# Patient Record
Sex: FEMALE | Race: WHITE | NOT HISPANIC OR LATINO | Employment: OTHER | URBAN - METROPOLITAN AREA
[De-identification: names, ages, dates, MRNs, and addresses within clinical notes are randomized per-mention and may not be internally consistent; named-entity substitution may affect disease eponyms.]

---

## 2022-10-05 ENCOUNTER — HOSPITAL ENCOUNTER (OUTPATIENT)
Facility: HOSPITAL | Age: 77
Discharge: HOME | End: 2022-10-06
Attending: EMERGENCY MEDICINE | Admitting: INTERNAL MEDICINE
Payer: MEDICARE

## 2022-10-05 DIAGNOSIS — S30.1XXA ABDOMINAL WALL HEMATOMA, INITIAL ENCOUNTER: ICD-10-CM

## 2022-10-05 DIAGNOSIS — R07.9 CHEST PAIN: ICD-10-CM

## 2022-10-05 DIAGNOSIS — S30.1XXA RECTUS SHEATH HEMATOMA, INITIAL ENCOUNTER: Primary | ICD-10-CM

## 2022-10-05 PROBLEM — I10 PRIMARY HYPERTENSION: Status: ACTIVE | Noted: 2022-10-05

## 2022-10-05 PROBLEM — C64.1 RENAL CELL CARCINOMA OF RIGHT KIDNEY: Status: ACTIVE | Noted: 2022-10-05

## 2022-10-05 PROBLEM — E11.9 TYPE 2 DIABETES MELLITUS: Status: ACTIVE | Noted: 2022-10-05

## 2022-10-05 LAB
ABO + RH BLD: NORMAL
ALBUMIN SERPL BCP-MCNC: 3.8 G/DL (ref 3.5–5.2)
ALP SERPL-CCNC: 69 U/L (ref 55–135)
ALT SERPL W/O P-5'-P-CCNC: 18 U/L (ref 10–44)
ANION GAP SERPL CALC-SCNC: 8 MMOL/L (ref 8–16)
AST SERPL-CCNC: 24 U/L (ref 10–40)
BASOPHILS # BLD AUTO: 0.06 K/UL (ref 0–0.2)
BASOPHILS NFR BLD: 0.9 % (ref 0–1.9)
BILIRUB SERPL-MCNC: 0.6 MG/DL (ref 0.1–1)
BLD GP AB SCN CELLS X3 SERPL QL: NORMAL
BUN SERPL-MCNC: 12 MG/DL (ref 8–23)
CALCIUM SERPL-MCNC: 8.8 MG/DL (ref 8.7–10.5)
CHLORIDE SERPL-SCNC: 107 MMOL/L (ref 95–110)
CO2 SERPL-SCNC: 24 MMOL/L (ref 23–29)
CREAT SERPL-MCNC: 0.8 MG/DL (ref 0.5–1.4)
DIFFERENTIAL METHOD: ABNORMAL
EOSINOPHIL # BLD AUTO: 0.1 K/UL (ref 0–0.5)
EOSINOPHIL NFR BLD: 0.9 % (ref 0–8)
ERYTHROCYTE [DISTWIDTH] IN BLOOD BY AUTOMATED COUNT: 13.1 % (ref 11.5–14.5)
EST. GFR  (NO RACE VARIABLE): >60 ML/MIN/1.73 M^2
GLUCOSE SERPL-MCNC: 91 MG/DL (ref 70–110)
HCT VFR BLD AUTO: 37 % (ref 37–48.5)
HGB BLD-MCNC: 12.4 G/DL (ref 12–16)
IMM GRANULOCYTES # BLD AUTO: 0.01 K/UL (ref 0–0.04)
IMM GRANULOCYTES NFR BLD AUTO: 0.1 % (ref 0–0.5)
INR PPP: 1 (ref 0.8–1.2)
LYMPHOCYTES # BLD AUTO: 2.1 K/UL (ref 1–4.8)
LYMPHOCYTES NFR BLD: 30.4 % (ref 18–48)
MCH RBC QN AUTO: 30.6 PG (ref 27–31)
MCHC RBC AUTO-ENTMCNC: 33.5 G/DL (ref 32–36)
MCV RBC AUTO: 91 FL (ref 82–98)
MONOCYTES # BLD AUTO: 0.5 K/UL (ref 0.3–1)
MONOCYTES NFR BLD: 7.7 % (ref 4–15)
NEUTROPHILS # BLD AUTO: 4.1 K/UL (ref 1.8–7.7)
NEUTROPHILS NFR BLD: 60 % (ref 38–73)
NRBC BLD-RTO: 0 /100 WBC
PLATELET # BLD AUTO: 137 K/UL (ref 150–450)
PLATELET BLD QL SMEAR: ABNORMAL
PMV BLD AUTO: 9.8 FL (ref 9.2–12.9)
POCT GLUCOSE: 151 MG/DL (ref 70–110)
POTASSIUM SERPL-SCNC: 4 MMOL/L (ref 3.5–5.1)
PROT SERPL-MCNC: 6.9 G/DL (ref 6–8.4)
PROTHROMBIN TIME: 10.7 SEC (ref 9–12.5)
RBC # BLD AUTO: 4.05 M/UL (ref 4–5.4)
SARS-COV-2 RDRP RESP QL NAA+PROBE: NEGATIVE
SODIUM SERPL-SCNC: 139 MMOL/L (ref 136–145)
WBC # BLD AUTO: 6.85 K/UL (ref 3.9–12.7)

## 2022-10-05 PROCEDURE — 85610 PROTHROMBIN TIME: CPT | Performed by: EMERGENCY MEDICINE

## 2022-10-05 PROCEDURE — 99203 PR OFFICE/OUTPT VISIT, NEW, LEVL III, 30-44 MIN: ICD-10-PCS | Mod: ,,, | Performed by: SURGERY

## 2022-10-05 PROCEDURE — G0378 HOSPITAL OBSERVATION PER HR: HCPCS

## 2022-10-05 PROCEDURE — 80053 COMPREHEN METABOLIC PANEL: CPT | Performed by: EMERGENCY MEDICINE

## 2022-10-05 PROCEDURE — A4216 STERILE WATER/SALINE, 10 ML: HCPCS | Performed by: INTERNAL MEDICINE

## 2022-10-05 PROCEDURE — 85025 COMPLETE CBC W/AUTO DIFF WBC: CPT | Performed by: EMERGENCY MEDICINE

## 2022-10-05 PROCEDURE — 99285 EMERGENCY DEPT VISIT HI MDM: CPT | Mod: 25,CS

## 2022-10-05 PROCEDURE — U0002 COVID-19 LAB TEST NON-CDC: HCPCS | Performed by: EMERGENCY MEDICINE

## 2022-10-05 PROCEDURE — 86850 RBC ANTIBODY SCREEN: CPT | Performed by: EMERGENCY MEDICINE

## 2022-10-05 PROCEDURE — 99203 OFFICE O/P NEW LOW 30 MIN: CPT | Mod: ,,, | Performed by: SURGERY

## 2022-10-05 PROCEDURE — 25000003 PHARM REV CODE 250: Performed by: INTERNAL MEDICINE

## 2022-10-05 RX ORDER — LOSARTAN POTASSIUM AND HYDROCHLOROTHIAZIDE 12.5; 5 MG/1; MG/1
1 TABLET ORAL DAILY
COMMUNITY

## 2022-10-05 RX ORDER — PAROXETINE HYDROCHLORIDE 20 MG/1
20 TABLET, FILM COATED ORAL DAILY
Status: DISCONTINUED | OUTPATIENT
Start: 2022-10-06 | End: 2022-10-06 | Stop reason: HOSPADM

## 2022-10-05 RX ORDER — LANOLIN ALCOHOL/MO/W.PET/CERES
800 CREAM (GRAM) TOPICAL
Status: DISCONTINUED | OUTPATIENT
Start: 2022-10-05 | End: 2022-10-06 | Stop reason: HOSPADM

## 2022-10-05 RX ORDER — AMOXICILLIN 250 MG
1 CAPSULE ORAL 2 TIMES DAILY PRN
Status: DISCONTINUED | OUTPATIENT
Start: 2022-10-05 | End: 2022-10-06 | Stop reason: HOSPADM

## 2022-10-05 RX ORDER — ACETAMINOPHEN 325 MG/1
650 TABLET ORAL EVERY 4 HOURS PRN
Status: DISCONTINUED | OUTPATIENT
Start: 2022-10-05 | End: 2022-10-06 | Stop reason: HOSPADM

## 2022-10-05 RX ORDER — GLUCAGON 1 MG
1 KIT INJECTION
Status: DISCONTINUED | OUTPATIENT
Start: 2022-10-05 | End: 2022-10-06 | Stop reason: HOSPADM

## 2022-10-05 RX ORDER — IBUPROFEN 200 MG
24 TABLET ORAL
Status: DISCONTINUED | OUTPATIENT
Start: 2022-10-05 | End: 2022-10-06 | Stop reason: HOSPADM

## 2022-10-05 RX ORDER — SODIUM,POTASSIUM PHOSPHATES 280-250MG
2 POWDER IN PACKET (EA) ORAL
Status: DISCONTINUED | OUTPATIENT
Start: 2022-10-05 | End: 2022-10-06 | Stop reason: HOSPADM

## 2022-10-05 RX ORDER — AMOXICILLIN 500 MG
1 CAPSULE ORAL DAILY
COMMUNITY

## 2022-10-05 RX ORDER — HYDROCODONE BITARTRATE AND ACETAMINOPHEN 5; 325 MG/1; MG/1
1 TABLET ORAL EVERY 6 HOURS PRN
Status: DISCONTINUED | OUTPATIENT
Start: 2022-10-05 | End: 2022-10-06 | Stop reason: HOSPADM

## 2022-10-05 RX ORDER — PAROXETINE HYDROCHLORIDE 20 MG/1
20 TABLET, FILM COATED ORAL DAILY
COMMUNITY

## 2022-10-05 RX ORDER — IBUPROFEN 200 MG
16 TABLET ORAL
Status: DISCONTINUED | OUTPATIENT
Start: 2022-10-05 | End: 2022-10-06 | Stop reason: HOSPADM

## 2022-10-05 RX ORDER — SIMETHICONE 80 MG
1 TABLET,CHEWABLE ORAL 4 TIMES DAILY PRN
Status: DISCONTINUED | OUTPATIENT
Start: 2022-10-05 | End: 2022-10-06 | Stop reason: HOSPADM

## 2022-10-05 RX ORDER — MAG HYDROX/ALUMINUM HYD/SIMETH 200-200-20
30 SUSPENSION, ORAL (FINAL DOSE FORM) ORAL 4 TIMES DAILY PRN
Status: DISCONTINUED | OUTPATIENT
Start: 2022-10-05 | End: 2022-10-06 | Stop reason: HOSPADM

## 2022-10-05 RX ORDER — TRAZODONE HYDROCHLORIDE 50 MG/1
75 TABLET ORAL NIGHTLY
COMMUNITY

## 2022-10-05 RX ORDER — INSULIN ASPART 100 [IU]/ML
0-5 INJECTION, SOLUTION INTRAVENOUS; SUBCUTANEOUS
Status: DISCONTINUED | OUTPATIENT
Start: 2022-10-05 | End: 2022-10-06 | Stop reason: HOSPADM

## 2022-10-05 RX ORDER — SODIUM CHLORIDE 0.9 % (FLUSH) 0.9 %
10 SYRINGE (ML) INJECTION
Status: DISCONTINUED | OUTPATIENT
Start: 2022-10-05 | End: 2022-10-06 | Stop reason: HOSPADM

## 2022-10-05 RX ORDER — CHOLECALCIFEROL (VITAMIN D3) 25 MCG
1000 TABLET ORAL DAILY
COMMUNITY

## 2022-10-05 RX ORDER — NALOXONE HCL 0.4 MG/ML
0.02 VIAL (ML) INJECTION
Status: DISCONTINUED | OUTPATIENT
Start: 2022-10-05 | End: 2022-10-06 | Stop reason: HOSPADM

## 2022-10-05 RX ORDER — PRAVASTATIN SODIUM 40 MG/1
40 TABLET ORAL NIGHTLY
COMMUNITY

## 2022-10-05 RX ORDER — ONDANSETRON 2 MG/ML
4 INJECTION INTRAMUSCULAR; INTRAVENOUS EVERY 8 HOURS PRN
Status: DISCONTINUED | OUTPATIENT
Start: 2022-10-05 | End: 2022-10-06 | Stop reason: HOSPADM

## 2022-10-05 RX ORDER — ZINC SULFATE 50(220)MG
220 CAPSULE ORAL DAILY
COMMUNITY

## 2022-10-05 RX ORDER — IBUPROFEN 100 MG/5ML
1000 SUSPENSION, ORAL (FINAL DOSE FORM) ORAL DAILY
COMMUNITY

## 2022-10-05 RX ORDER — PRAVASTATIN SODIUM 20 MG/1
40 TABLET ORAL NIGHTLY
Status: DISCONTINUED | OUTPATIENT
Start: 2022-10-05 | End: 2022-10-06 | Stop reason: HOSPADM

## 2022-10-05 RX ORDER — NIFEDIPINE 90 MG/1
90 TABLET, EXTENDED RELEASE ORAL DAILY
COMMUNITY

## 2022-10-05 RX ORDER — IPRATROPIUM BROMIDE AND ALBUTEROL SULFATE 2.5; .5 MG/3ML; MG/3ML
3 SOLUTION RESPIRATORY (INHALATION) EVERY 4 HOURS PRN
Status: DISCONTINUED | OUTPATIENT
Start: 2022-10-05 | End: 2022-10-06 | Stop reason: HOSPADM

## 2022-10-05 RX ORDER — DOCUSATE SODIUM 100 MG/1
100 CAPSULE, LIQUID FILLED ORAL NIGHTLY
COMMUNITY

## 2022-10-05 RX ORDER — ONDANSETRON 8 MG/1
8 TABLET, ORALLY DISINTEGRATING ORAL EVERY 8 HOURS PRN
Status: DISCONTINUED | OUTPATIENT
Start: 2022-10-05 | End: 2022-10-06 | Stop reason: HOSPADM

## 2022-10-05 RX ORDER — MONTELUKAST SODIUM 10 MG/1
10 TABLET ORAL DAILY
COMMUNITY

## 2022-10-05 RX ORDER — RUTIN/HESP/BIOFLAV/C/HERBAL196 40-25-50MG
1 TABLET ORAL DAILY
COMMUNITY

## 2022-10-05 RX ORDER — TALC
6 POWDER (GRAM) TOPICAL NIGHTLY PRN
Status: DISCONTINUED | OUTPATIENT
Start: 2022-10-05 | End: 2022-10-06 | Stop reason: HOSPADM

## 2022-10-05 RX ORDER — LANOLIN ALCOHOL/MO/W.PET/CERES
1000 CREAM (GRAM) TOPICAL DAILY
COMMUNITY

## 2022-10-05 RX ORDER — SODIUM CHLORIDE 0.9 % (FLUSH) 0.9 %
10 SYRINGE (ML) INJECTION EVERY 8 HOURS
Status: DISCONTINUED | OUTPATIENT
Start: 2022-10-05 | End: 2022-10-06 | Stop reason: HOSPADM

## 2022-10-05 RX ORDER — HYDROCODONE BITARTRATE AND ACETAMINOPHEN 10; 325 MG/1; MG/1
1 TABLET ORAL EVERY 6 HOURS PRN
Status: DISCONTINUED | OUTPATIENT
Start: 2022-10-05 | End: 2022-10-06 | Stop reason: HOSPADM

## 2022-10-05 RX ADMIN — Medication 10 ML: at 11:10

## 2022-10-05 RX ADMIN — TRAZODONE HYDROCHLORIDE 75 MG: 50 TABLET ORAL at 08:10

## 2022-10-05 RX ADMIN — PRAVASTATIN SODIUM 40 MG: 20 TABLET ORAL at 08:10

## 2022-10-05 NOTE — CONSULTS
[facility] - Emergency Dept.  General Surgery  Consult Note    Patient Name: Evie Matthews  MRN: [id number]  Code Status: No Order  Admission Date: 10/5/2022  Hospital Length of Stay: 0 days  Attending Physician: Calvin Harrington Jr., MD  Primary Care Provider: Herb Wesley MD    Patient information was obtained from spouse/SO, relative(s), ER records and Images from [facility].     Consults  Subjective:     Principal Problem: <principal problem not specified>    History of Present Illness: 77-year-old female who presented to [facility] emergency room due to some right lower quadrant abdominal pain.  She was concerned that she might have appendicitis.  The patient has been doing what she describes as rather strenuous yd work over the last 2 or 3 days.      The pain was located in the right lower abdomen was sharp to stabbing in nature.  Was not associated with any nausea, vomiting, fever chills.      She was evaluated with a CT scan there that showed a rectus sheath hematoma with active extravasation.  She was transferred to [facility] in [place] as there was no Interventional Radiology available at [facility].      Her only antiplatelet medication is aspirin           No current facility-administered medications on file prior to encounter.     No current outpatient medications on file prior to encounter.       Review of patient's allergies indicates:  No Known Allergies    No past medical history on file.  No past surgical history on file.  Family History    None       Tobacco Use    Smoking status: Not on file    Smokeless tobacco: Not on file   Substance and Sexual Activity    Alcohol use: Not on file    Drug use: Not on file    Sexual activity: Not on file     Review of Systems   Constitutional:  Negative for appetite change, chills, fatigue, fever and unexpected weight change.   HENT:  Negative for hearing loss and rhinorrhea.    Eyes:  Negative for visual disturbance.   Respiratory:  Negative for  apnea, cough, shortness of breath and wheezing.    Cardiovascular:  Negative for chest pain and palpitations.   Gastrointestinal:  Positive for abdominal pain. Negative for abdominal distention, blood in stool, constipation, diarrhea, nausea and vomiting.   Genitourinary:  Negative for dysuria, frequency and urgency.   Musculoskeletal:  Negative for arthralgias and neck pain.   Skin:  Negative for rash.   Neurological:  Negative for seizures, weakness, numbness and headaches.   Hematological:  Negative for adenopathy. Does not bruise/bleed easily.   Psychiatric/Behavioral:  Negative for hallucinations. The patient is not nervous/anxious.    Objective:     Vital Signs (Most Recent):  Temp: 98.6 °F (37 °C) (10/05/22 1357)  Pulse: (!) 58 (10/05/22 1500)  Resp: 20 (10/05/22 1500)  BP: (!) 144/72 (10/05/22 1500)  SpO2: 98 % (10/05/22 1500)   Vital Signs (24h Range):  Temp:  [98.6 °F (37 °C)] 98.6 °F (37 °C)  Pulse:  [58-80] 58  Resp:  [18-20] 20  SpO2:  [97 %-99 %] 98 %  BP: (140-162)/(68-77) 144/72     Weight: 64.9 kg (143 lb)  There is no height or weight on file to calculate BMI.    Physical Exam  Vitals and nursing note reviewed.   Constitutional:       Appearance: She is well-developed.   HENT:      Head: Normocephalic.   Eyes:      Pupils: Pupils are equal, round, and reactive to light.   Neck:      Thyroid: No thyromegaly.      Vascular: No JVD.      Trachea: No tracheal deviation.   Cardiovascular:      Rate and Rhythm: Normal rate and regular rhythm.      Heart sounds: Normal heart sounds.   Pulmonary:      Breath sounds: Normal breath sounds. No wheezing. Rales: ..  Abdominal:      General: Bowel sounds are normal. There is no distension.      Palpations: Abdomen is soft. Abdomen is not rigid. There is no mass.      Tenderness: There is abdominal tenderness (Mild right lower quadrant over the rectus muscle). There is no guarding or rebound.   Musculoskeletal:         General: Normal range of motion.    Lymphadenopathy:      Cervical: No cervical adenopathy.   Skin:     General: Skin is warm and dry.      Findings: No erythema or rash.   Neurological:      Mental Status: She is oriented to person, place, and time.       Significant Labs:  I have reviewed all pertinent lab results within the past 24 hours.  CBC:   Recent Labs   Lab 10/05/22  1534   WBC 6.85   RBC 4.05   HGB 12.4   HCT 37.0   *   MCV 91   MCH 30.6   MCHC 33.5     BMP: No results for input(s): GLU, NA, K, CL, CO2, BUN, CREATININE, CALCIUM, MG in the last 168 hours.  CMP: No results for input(s): GLU, CALCIUM, ALBUMIN, PROT, NA, K, CO2, CL, BUN, CREATININE, ALKPHOS, ALT, AST, BILITOT in the last 168 hours.    Significant Diagnostics:  I have reviewed all pertinent imaging results/findings within the past 24 hours.     CT scan [facility] from [facility] reviewed.  There is a gallstone that is calcified.  There is a hematoma of the lower rectus sheath on the right with a contrast blush    Repeat pelvic CT    Narrative & Impression  EXAMINATION:  CT ABDOMEN PELVIS WITHOUT CONTRAST     CLINICAL HISTORY:  Right lower quadrant pain., Abdominal pain, acute (Ped 0-18y);     TECHNIQUE:  Limited noncontrast CT scan of the abdomen and pelvis.     COMPARISON:  Abdominal CT scan from outside institution 10/05/2022.     FINDINGS:  Lung bases are clear with no obvious infiltrate or pleural effusion.     The liver is nonenlarged.  The gallbladder demonstrates several medium-size calcified gallstones.  The gallbladder is mildly distended     The spleen is nonenlarged with several punctate calcifications.     The abdominal aorta is nondilated.  Minimal calcification is seen.  The IVC is normal.     The pancreatic region appears normal.     No visible right-sided kidney is seen.  The left kidney is grossly normal in size with contrast within the collecting system and ureter.  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     The bowel loops are nondilated.     In the pelvis there is hyperdense contrast in the urinary bladder.     The uterus is absent.     There is hyperdense thickening of the right rectus muscle with infiltration of the deep abdominal wall adipose tissue.  Findings are consistent with a right rectus sheath hematoma.  The overall size is similar to the recent CT scan.     Minimal pelvic fluid.     Moderate lumbar degenerative disc disease.     Impression:     Right inferior rectus sheath abdominal wall hematoma.  Associated infiltration of the deep abdominal wall adipose tissue.  Overall size similar allowing for differences in technique to the recent CT scan from outside institution at 07:00 hours.     Status post hysterectomy.     Cholelithiasis.     Solitary left kidney.     All CT scans at this facility are performed  using dose modulation techniques as appropriate to performed exam including the following:  automated exposure control; adjustment of mA and/or kV according to the patients size (this includes techniques or standardized protocols for targeted exams where dose is matched to indication/reason for exam: i.e. extremities or head);  iterative reconstruction technique.        Electronically signed by: Boston Fuentes MD  Date:                                            10/05/2022    Assessment/Plan:     Rectus sheath hematoma, initial encounter  Traumatic rectus sheath hematoma.      Although there was a contrast blush on the initial CT scan the repeat CT scan shows no significant change in size.  The patient is pain free.      Her hemoglobin and hematocrit are stable.      Recommend admission to observation, Hospital Medicine   Serial hemoglobin and hematocrit  If her hemoglobin and hematocrit drops then angiography with embolization per Interventional Radiology.      Surgical intervention only if enlarging hematoma that could not be controlled by Interventional Radiology of the patient develops  an infection.      This was discussed with the patient and her relative, son.            VTE Risk Mitigation (From admission, onward)    None          Thank you for your consult. I will follow-up with patient. Please contact us if you have any additional questions.    Segun Bustamante MD  General Surgery  [facility] - Emergency Dept.

## 2022-10-05 NOTE — PHARMACY MED REC
"Admission Medication History     The home medication history was taken by Escobar Batista.    You may go to "Admission" then "Reconcile Home Medications" tabs to review and/or act upon these items.     The home medication list has been updated by the Pharmacy department.   Please read ALL comments highlighted in yellow.   Please address this information as you see fit.    Feel free to contact us if you have any questions or require assistance.      Medications listed below were obtained from: Patient/family, [employer], and Patient's pharmacy  (Not in a hospital admission)      Potential issues to be addressed PRIOR TO DISCHARGE: NONE    Escobar Batista, CPhT-Adv  Pharmacy Technician Specialist-Medication History  [facility] [phone]  Secure chat preferred     Current Outpatient Medications on File Prior to Encounter   Medication Sig Dispense Refill Last Dose    ascorbic acid, vitamin C, (VITAMIN C) 1000 MG tablet Take 1,000 mg by mouth once daily.   10/4/2022    cyanocobalamin (VITAMIN B-12) 1000 MCG tablet Take 1,000 mcg by mouth once daily.   10/4/2022    DAILY GARLIC ONCE-A-DAY ORAL Take 1 tablet by mouth once daily.   10/4/2022    docusate sodium (COLACE) 100 MG capsule Take 100 mg by mouth every evening.   10/4/2022    losartan-hydrochlorothiazide 50-12.5 mg (HYZAAR) 50-12.5 mg per tablet Take 1 tablet by mouth once daily.   10/4/2022    montelukast (SINGULAIR) 10 mg tablet Take 10 mg by mouth once daily.   10/4/2022    NIFEdipine (PROCARDIA-XL) 90 MG (OSM) 24 hr tablet Take 90 mg by mouth once daily.   10/4/2022    omega-3 fatty acids/fish oil (FISH OIL-OMEGA-3 FATTY ACIDS) 300-1,000 mg capsule Take 1 capsule by mouth once daily.   10/4/2022    paroxetine (PAXIL) 20 MG tablet Take 20 mg by mouth once daily.   10/4/2022    pravastatin (PRAVACHOL) 40 MG tablet Take 40 mg by mouth every evening.   10/4/2022    traZODone (DESYREL) 50 MG tablet Take 75 mg by mouth every evening.   10/4/2022    vit " [id number] (BIOFLEX) 856-72-12-40 mg Tab Take 1 tablet by mouth once daily.   10/4/2022    vitamin D (VITAMIN D3) 1000 units Tab Take 1,000 Units by mouth once daily.   10/4/2022    zinc sulfate (ZINCATE) 50 mg zinc (220 mg) capsule Take 220 mg by mouth once daily.   10/4/2022                           .

## 2022-10-05 NOTE — ASSESSMENT & PLAN NOTE
Patient's FSGs are controlled on current medication regimen.  Last A1c reviewed- Most recent fingerstick glucose reviewed- Current correctional scale  Low  Maintain anti-hyperglycemic dose as follows-   Antihyperglycemics (From admission, onward)    Start     Stop Route Frequency Ordered    10/05/22 1849  insulin aspart U-100 pen 0-5 Units         -- SubQ Before meals & nightly PRN 10/05/22 1756        Hold Oral hypoglycemics while patient is in the hospital.

## 2022-10-05 NOTE — SUBJECTIVE & OBJECTIVE
No current facility-administered medications on file prior to encounter.     No current outpatient medications on file prior to encounter.       Review of patient's allergies indicates:  No Known Allergies    No past medical history on file.  No past surgical history on file.  Family History    None       Tobacco Use    Smoking status: Not on file    Smokeless tobacco: Not on file   Substance and Sexual Activity    Alcohol use: Not on file    Drug use: Not on file    Sexual activity: Not on file     Review of Systems   Constitutional:  Negative for appetite change, chills, fatigue, fever and unexpected weight change.   HENT:  Negative for hearing loss and rhinorrhea.    Eyes:  Negative for visual disturbance.   Respiratory:  Negative for apnea, cough, shortness of breath and wheezing.    Cardiovascular:  Negative for chest pain and palpitations.   Gastrointestinal:  Positive for abdominal pain. Negative for abdominal distention, blood in stool, constipation, diarrhea, nausea and vomiting.   Genitourinary:  Negative for dysuria, frequency and urgency.   Musculoskeletal:  Negative for arthralgias and neck pain.   Skin:  Negative for rash.   Neurological:  Negative for seizures, weakness, numbness and headaches.   Hematological:  Negative for adenopathy. Does not bruise/bleed easily.   Psychiatric/Behavioral:  Negative for hallucinations. The patient is not nervous/anxious.    Objective:     Vital Signs (Most Recent):  Temp: 98.6 °F (37 °C) (10/05/22 1357)  Pulse: (!) 58 (10/05/22 1500)  Resp: 20 (10/05/22 1500)  BP: (!) 144/72 (10/05/22 1500)  SpO2: 98 % (10/05/22 1500)   Vital Signs (24h Range):  Temp:  [98.6 °F (37 °C)] 98.6 °F (37 °C)  Pulse:  [58-80] 58  Resp:  [18-20] 20  SpO2:  [97 %-99 %] 98 %  BP: (140-162)/(68-77) 144/72     Weight: 64.9 kg (143 lb)  There is no height or weight on file to calculate BMI.    Physical Exam  Vitals and nursing note reviewed.   Constitutional:       Appearance: She is  well-developed.   HENT:      Head: Normocephalic.   Eyes:      Pupils: Pupils are equal, round, and reactive to light.   Neck:      Thyroid: No thyromegaly.      Vascular: No JVD.      Trachea: No tracheal deviation.   Cardiovascular:      Rate and Rhythm: Normal rate and regular rhythm.      Heart sounds: Normal heart sounds.   Pulmonary:      Breath sounds: Normal breath sounds. No wheezing. Rales: ..  Abdominal:      General: Bowel sounds are normal. There is no distension.      Palpations: Abdomen is soft. Abdomen is not rigid. There is no mass.      Tenderness: There is abdominal tenderness (Mild right lower quadrant over the rectus muscle). There is no guarding or rebound.   Musculoskeletal:         General: Normal range of motion.   Lymphadenopathy:      Cervical: No cervical adenopathy.   Skin:     General: Skin is warm and dry.      Findings: No erythema or rash.   Neurological:      Mental Status: She is oriented to person, place, and time.       Significant Labs:  I have reviewed all pertinent lab results within the past 24 hours.  CBC:   Recent Labs   Lab 10/05/22  1534   WBC 6.85   RBC 4.05   HGB 12.4   HCT 37.0   *   MCV 91   MCH 30.6   MCHC 33.5     BMP: No results for input(s): GLU, NA, K, CL, CO2, BUN, CREATININE, CALCIUM, MG in the last 168 hours.  CMP: No results for input(s): GLU, CALCIUM, ALBUMIN, PROT, NA, K, CO2, CL, BUN, CREATININE, ALKPHOS, ALT, AST, BILITOT in the last 168 hours.    Significant Diagnostics:  I have reviewed all pertinent imaging results/findings within the past 24 hours.     CT scan images from [facility] reviewed.  There is a gallstone that is calcified.  There is a hematoma of the lower rectus sheath on the right with a contrast blush    Repeat pelvic CT    Narrative & Impression  EXAMINATION:  CT ABDOMEN PELVIS WITHOUT CONTRAST     CLINICAL HISTORY:  Right lower quadrant pain., Abdominal pain, acute (Ped 0-18y);     TECHNIQUE:  Limited noncontrast CT  scan of the abdomen and pelvis.     COMPARISON:  Abdominal CT scan from outside institution 10/05/2022.     FINDINGS:  Lung bases are clear with no obvious infiltrate or pleural effusion.     The liver is nonenlarged.  The gallbladder demonstrates several medium-size calcified gallstones.  The gallbladder is mildly distended     The spleen is nonenlarged with several punctate calcifications.     The abdominal aorta is nondilated.  Minimal calcification is seen.  The IVC is normal.     The pancreatic region appears normal.     No visible right-sided kidney is seen.  The left kidney is grossly normal in size with contrast within the collecting system and ureter.  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     The bowel loops are nondilated.     In the pelvis there is hyperdense contrast in the urinary bladder.     The uterus is absent.     There is hyperdense thickening of the right rectus muscle with infiltration of the deep abdominal wall adipose tissue.  Findings are consistent with a right rectus sheath hematoma.  The overall size is similar to the recent CT scan.     Minimal pelvic fluid.     Moderate lumbar degenerative disc disease.     Impression:     Right inferior rectus sheath abdominal wall hematoma.  Associated infiltration of the deep abdominal wall adipose tissue.  Overall size similar allowing for differences in technique to the recent CT scan from outside institution at 07:00 hours.     Status post hysterectomy.     Cholelithiasis.     Solitary left kidney.     All CT scans at this facility are performed  using dose modulation techniques as appropriate to performed exam including the following:  automated exposure control; adjustment of mA and/or kV according to the patients size (this includes techniques or standardized protocols for targeted exams where dose is matched to indication/reason for exam: i.e. extremities or head);  iterative reconstruction technique.         Electronically signed by: Boston Fuentes MD  Date:                                            10/05/2022

## 2022-10-05 NOTE — ASSESSMENT & PLAN NOTE
Monitor for Decompensation  IR on case  Check H/H  Repeat CT - Negative for change as compared to the 7 am study at [facility]

## 2022-10-05 NOTE — H&P
[facility]  [facility]  History & Physical    Patient Name: Evie Matthews  MRN: [id number]  Patient Class: OP- Observation  Admission Date: 10/5/2022  Attending Physician: Segundo Rodrigues MD  Primary Care Provider: Herb Wesley MD         Patient information was obtained from patient, relative(s), past medical records and ER records.     Subjective:     Principal Problem:Rectus sheath hematoma, initial encounter    Chief Complaint:   Chief Complaint   Patient presents with    Abdominal Pain     Pt. Is a transfer from [place] for RLQ pain for 3 day. R/O gallstones & hematoma to right abd. Rectus with active bleeding.        HPI: Patient 76 yo female presenting in transfer from [facility] for Rectus Abdominal Wall Hematoma. Patient with a  pmhx significant for RCC s/p R Nephrectomy, DM2, HTN identified with R Abdominal wall hematoma - Patient underwent a follow up CT on presentation and it was determined the Hematoma was unchanged from th 0700 exam. Patient reports improvement in her discomfort and is requesting food. Patient with a fall from her hourse several days ago with a progressive abdominal pain. Patient reports symptoms are controlled and currently she is pain free. Patient case reviewed with IR who felt no intervention was warranted at this time. Plan is to place the patient in observation, NPO after midnight, hold anticoagulation, obtain H/H in am and consider next steps based on the developing clinical picture. Surgery evaluated the patient and did not feel the patient had a surgical need at this juncture.     Patient is a full code               Patient is placed in Observation    Past Medical History:   Diagnosis Date    Cancer     Diabetes mellitus     Hypertension        Past Surgical History:   Procedure Laterality Date    FRACTURE SURGERY      HYSTERECTOMY      LAPAROSCOPIC NEPHRECTOMY         Review of patient's allergies indicates:  No Known Allergies    No current  facility-administered medications on file prior to encounter.     Current Outpatient Medications on File Prior to Encounter   Medication Sig    ascorbic acid, vitamin C, (VITAMIN C) 1000 MG tablet Take 1,000 mg by mouth once daily.    cyanocobalamin (VITAMIN B-12) 1000 MCG tablet Take 1,000 mcg by mouth once daily.    DAILY GARLIC ONCE-A-DAY ORAL Take 1 tablet by mouth once daily.    docusate sodium (COLACE) 100 MG capsule Take 100 mg by mouth every evening.    losartan-hydrochlorothiazide 50-12.5 mg (HYZAAR) 50-12.5 mg per tablet Take 1 tablet by mouth once daily.    montelukast (SINGULAIR) 10 mg tablet Take 10 mg by mouth once daily.    NIFEdipine (PROCARDIA-XL) 90 MG (OSM) 24 hr tablet Take 90 mg by mouth once daily.    omega-3 fatty acids/fish oil (FISH OIL-OMEGA-3 FATTY ACIDS) 300-1,000 mg capsule Take 1 capsule by mouth once daily.    paroxetine (PAXIL) 20 MG tablet Take 20 mg by mouth once daily.    pravastatin (PRAVACHOL) 40 MG tablet Take 40 mg by mouth every evening.    traZODone (DESYREL) 50 MG tablet Take 75 mg by mouth every evening.    vit I-nqhqvtn-hvve-rutin-hb196 (BIOFLEX) 745-12-35-40 mg Tab Take 1 tablet by mouth once daily.    vitamin D (VITAMIN D3) 1000 units Tab Take 1,000 Units by mouth once daily.    zinc sulfate (ZINCATE) 50 mg zinc (220 mg) capsule Take 220 mg by mouth once daily.     Family History       Problem Relation (Age of Onset)    Diabetes Father    Heart disease Father    Hypertension Mother    Stroke Mother          Tobacco Use    Smoking status: Never    Smokeless tobacco: Never   Substance and Sexual Activity    Alcohol use: Not Currently     Alcohol/week: 1.0 standard drink     Types: 1 Glasses of wine per week    Drug use: Never    Sexual activity: Not Currently     Partners: Male     Review of Systems   Constitutional:  Positive for activity change. Negative for unexpected weight change.   HENT: Negative.  Negative for trouble swallowing.    Eyes: Negative.    Respiratory:  Negative.  Negative for cough, shortness of breath and wheezing.    Cardiovascular: Negative.  Negative for chest pain.   Gastrointestinal:  Positive for abdominal pain.   Endocrine: Negative.    Genitourinary: Negative.    Musculoskeletal: Negative.    Skin: Negative.    Allergic/Immunologic: Negative.    Neurological:  Positive for weakness. Negative for dizziness.   Hematological: Negative.    Psychiatric/Behavioral: Negative.     All other systems reviewed and are negative.  Objective:     Vital Signs (Most Recent):  Temp: 98.6 °F (37 °C) (10/05/22 1357)  Pulse: 62 (10/05/22 1630)  Resp: (!) 24 (10/05/22 1630)  BP: 121/62 (10/05/22 1630)  SpO2: 97 % (10/05/22 1630)   Vital Signs (24h Range):  Temp:  [98.6 °F (37 °C)] 98.6 °F (37 °C)  Pulse:  [58-80] 62  Resp:  [18-24] 24  SpO2:  [97 %-99 %] 97 %  BP: (121-162)/(62-77) 121/62     Weight: 64.9 kg (143 lb)  Body mass index is 23.8 kg/m².    Physical Exam  Vitals and nursing note reviewed.   Constitutional:       General: She is not in acute distress.     Appearance: Normal appearance. She is well-developed. She is not ill-appearing, toxic-appearing or diaphoretic.   HENT:      Head: Normocephalic and atraumatic.      Right Ear: External ear normal.      Left Ear: External ear normal.   Eyes:      Pupils: Pupils are equal, round, and reactive to light.   Neck:      Vascular: No JVD.   Cardiovascular:      Rate and Rhythm: Normal rate and regular rhythm.      Pulses: Normal pulses.      Heart sounds: Normal heart sounds. No murmur heard.  Pulmonary:      Effort: Pulmonary effort is normal. No respiratory distress.      Breath sounds: Normal breath sounds. No wheezing.   Abdominal:      General: Bowel sounds are normal. There is no distension.      Palpations: Abdomen is soft.      Tenderness: There is abdominal tenderness. There is no guarding.   Musculoskeletal:         General: No tenderness. Normal range of motion.      Cervical back: Normal range of motion and  neck supple.   Skin:     General: Skin is warm and dry.      Findings: No erythema.   Neurological:      Mental Status: She is alert and oriented to person, place, and time.      Cranial Nerves: No cranial nerve deficit.      Deep Tendon Reflexes: Reflexes are normal and symmetric.   Psychiatric:         Mood and Affect: Mood normal.         Behavior: Behavior normal.         Thought Content: Thought content normal.         Judgment: Judgment normal.         CRANIAL NERVES     CN III, IV, VI   Pupils are equal, round, and reactive to light.     Significant Labs: All pertinent labs within the past 24 hours have been reviewed.  BMP:   Recent Labs   Lab 10/05/22  1534   GLU 91      K 4.0      CO2 24   BUN 12   CREATININE 0.8   CALCIUM 8.8     CBC:   Recent Labs   Lab 10/05/22  1534   WBC 6.85   HGB 12.4   HCT 37.0   *     CMP:   Recent Labs   Lab 10/05/22  1534      K 4.0      CO2 24   GLU 91   BUN 12   CREATININE 0.8   CALCIUM 8.8   PROT 6.9   ALBUMIN 3.8   BILITOT 0.6   ALKPHOS 69   AST 24   ALT 18   ANIONGAP 8     Imaging Results              CT Abdomen Pelvis  Without Contrast (Final result)  Result time 10/05/22 16:15:38   Procedure changed from CT Abdomen Pelvis With Contrast     Final result by Boston Fuentes MD (10/05/22 16:15:38)                   Impression:      Right inferior rectus sheath abdominal wall hematoma.  Associated infiltration of the deep abdominal wall adipose tissue.  Overall size similar allowing for differences in technique to the recent CT scan from outside institution at 07:00 hours.    Status post hysterectomy.    Cholelithiasis.    Solitary left kidney.    All CT scans at this facility are performed  using dose modulation techniques as appropriate to performed exam including the following:  automated exposure control; adjustment of mA and/or kV according to the patients size (this includes techniques or standardized protocols for targeted exams where  dose is matched to indication/reason for exam: i.e. extremities or head);  iterative reconstruction technique.      Electronically signed by: Boston Fuentes MD  Date:    10/05/2022  Time:    16:15               Narrative:    EXAMINATION:  CT ABDOMEN PELVIS WITHOUT CONTRAST    CLINICAL HISTORY:  Right lower quadrant pain., Abdominal pain, acute (Ped 0-18y);    TECHNIQUE:  Limited noncontrast CT scan of the abdomen and pelvis.    COMPARISON:  Abdominal CT scan from outside institution 10/05/2022.    FINDINGS:  Lung bases are clear with no obvious infiltrate or pleural effusion.    The liver is nonenlarged.  The gallbladder demonstrates several medium-size calcified gallstones.  The gallbladder is mildly distended    The spleen is nonenlarged with several punctate calcifications.    The abdominal aorta is nondilated.  Minimal calcification is seen.  The IVC is normal.    The pancreatic region appears normal.    No visible right-sided kidney is seen.  The left kidney is grossly normal in size with contrast within the collecting system and ureter.  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    The bowel loops are nondilated.    In the pelvis there is hyperdense contrast in the urinary bladder.    The uterus is absent.    There is hyperdense thickening of the right rectus muscle with infiltration of the deep abdominal wall adipose tissue.  Findings are consistent with a right rectus sheath hematoma.  The overall size is similar to the recent CT scan.    Minimal pelvic fluid.    Moderate lumbar degenerative disc disease.                                        Significant Imaging: I have reviewed all pertinent imaging results/findings within the past 24 hours.    Assessment/Plan:     * Rectus sheath hematoma, initial encounter  Monitor for Decompensation  IR on case  Check H/H  Repeat CT - Negative for change as compared to the 7 am study at OSH      Type 2 diabetes mellitus  Patient's  FSGs are controlled on current medication regimen.  Last A1c reviewed- Most recent fingerstick glucose reviewed- Current correctional scale  Low  Maintain anti-hyperglycemic dose as follows-   Antihyperglycemics (From admission, onward)      Start     Stop Route Frequency Ordered    10/05/22 1849  insulin aspart U-100 pen 0-5 Units         -- SubQ Before meals & nightly PRN 10/05/22 1756          Hold Oral hypoglycemics while patient is in the hospital.    Renal cell carcinoma of right kidney  S/p R Nephrectomy  Stable  Monitor  O/P follow up      Primary hypertension  BP Controlled  Monitor        VTE Risk Mitigation (From admission, onward)           Ordered     IP VTE HIGH RISK PATIENT  Once         10/05/22 1756     Place sequential compression device  Until discontinued         10/05/22 1756     Place sequential compression device  Until discontinued         10/05/22 1756     Reason for No Pharmacological VTE Prophylaxis  Once        Question:  Reasons:  Answer:  Active Bleeding    10/05/22 1756                       Segundo Rodrigues MD  Department of Hospital Medicine   [facility] - Emergency Dept.

## 2022-10-05 NOTE — HPI
Patient 76 yo female presenting in transfer from [facility] for Rectus Abdominal Wall Hematoma. Patient with a  pmhx significant for RCC s/p R Nephrectomy, DM2, HTN identified with R Abdominal wall hematoma - Patient underwent a follow up CT on presentation and it was determined the Hematoma was unchanged from th 0700 exam. Patient reports improvement in her discomfort and is requesting food. Patient with a fall from her hourse several days ago with a progressive abdominal pain. Patient reports symptoms are controlled and currently she is pain free. Patient case reviewed with IR who felt no intervention was warranted at this time. Plan is to place the patient in observation, NPO after midnight, hold anticoagulation, obtain H/H in am and consider next steps based on the developing clinical picture. Surgery evaluated the patient and did not feel the patient had a surgical need at this juncture.     Patient is a full code

## 2022-10-05 NOTE — ED PROVIDER NOTES
SCRIBE #1 NOTE: I, Juan Ramon Escamilla, am scribing for, and in the presence of, Calvin Harrington Jr., MD. I have scribed the HPI, ROS, and PEx.      History      Chief Complaint   Patient presents with    Abdominal Pain     Pt. Is a transfer from [place] for RLQ pain for 3 day. R/O gallstones & hematoma to right abd. Rectus with active bleeding.     Review of patient's allergies indicates:  No Known Allergies     HPI   HPI    10/5/2022, 2:09 PM   History obtained from the patient      History of Present Illness: Evie Matthews is a 77 y.o. female patient with a PMHx of renal cell cancer, HTN, DM2 who presents to the Emergency Department for RLQ abdominal pain, onset last night. Pt was transferred from [facility] for Interventional Radiology services after CT showed findings concerning for rectus abdominal muscle hematoma. Symptoms are constant and moderate in severity. No mitigating or exacerbating factors reported. No associated sxs reported. Patient denies any trauma/injury, fever, chills, n/v/d, SOB, CP, weakness, numbness, dizziness, headache, and all other sxs at this time. No prior Tx reported. No further complaints or concerns at this time.     Arrival mode: EMS    PCP: Herb Wesley MD       Past Medical History:  No past medical history on file.    Past Surgical History:  No past surgical history on file.      Family History:  No family history on file.    Social History:  Social History     Tobacco Use    Smoking status: Not on file    Smokeless tobacco: Not on file   Substance and Sexual Activity    Alcohol use: Not on file    Drug use: Not on file    Sexual activity: Not on file       ROS   Review of Systems   Constitutional:  Negative for chills and fever.   HENT:  Negative for sore throat.    Respiratory:  Negative for shortness of breath.    Cardiovascular:  Negative for chest pain.   Gastrointestinal:  Positive for abdominal pain (RLQ). Negative for diarrhea, nausea and vomiting.   Genitourinary:  Negative for  dysuria.   Musculoskeletal:  Negative for back pain.   Skin:  Negative for rash.   Neurological:  Negative for dizziness, weakness, light-headedness, numbness and headaches.   Hematological:  Does not bruise/bleed easily.   All other systems reviewed and are negative.    Physical Exam      Initial Vitals [10/05/22 1357]   BP Pulse Resp Temp SpO2   (!) 140/68 80 18 98.6 °F (37 °C) 97 %      MAP       --          Physical Exam  Nursing Notes and Vital Signs Reviewed.  Constitutional: Patient is in no acute distress. Well-developed and well-nourished.  Head: Atraumatic. Normocephalic.  Eyes: PERRL. EOM intact. Conjunctivae are not pale. No scleral icterus.  ENT: Mucous membranes are moist. Oropharynx is clear and symmetric.    Neck: Supple. Full ROM. No lymphadenopathy.  Cardiovascular: Regular rate. Regular rhythm. No murmurs, rubs, or gallops. Distal pulses are 2+ and symmetric.  Pulmonary/Chest: No respiratory distress. Clear to auscultation bilaterally. No wheezing or rales.  Abdominal: Soft and non-distended.  There is RLQ tenderness.  No rebound, guarding, or rigidity.   Musculoskeletal: Moves all extremities. No obvious deformities. No edema.  Skin: Warm and dry.  Neurological:  Alert, awake, and appropriate.  Normal speech.  No acute focal neurological deficits are appreciated.  Psychiatric: Normal affect. Good eye contact. Appropriate in content.    ED Course    Procedures  ED Vital Signs:  Vitals:    10/05/22 1357 10/05/22 1500 10/05/22 1503   BP: (!) 140/68 (!) 144/72    Pulse: 80 (!) 58    Resp: 18 20    Temp: 98.6 °F (37 °C)     TempSrc: Oral     SpO2: 97% 98%    Weight:   64.9 kg (143 lb)       Abnormal Lab Results:  Labs Reviewed   CBC W/ AUTO DIFFERENTIAL - Abnormal; Notable for the following components:       Result Value    Platelets 137 (*)     Platelet Estimate Clumped (*)     All other components within normal limits   COMPREHENSIVE METABOLIC PANEL   PROTIME-INR   SARS-COV-2 RNA AMPLIFICATION, QUAL    TYPE & SCREEN        All Lab Results:  Results for orders placed or performed during the hospital encounter of 10/05/22   CBC auto differential   Result Value Ref Range    WBC 6.85 3.90 - 12.70 K/uL    RBC 4.05 4.00 - 5.40 M/uL    Hemoglobin 12.4 12.0 - 16.0 g/dL    Hematocrit 37.0 37.0 - 48.5 %    MCV 91 82 - 98 fL    MCH 30.6 27.0 - 31.0 pg    MCHC 33.5 32.0 - 36.0 g/dL    RDW 13.1 11.5 - 14.5 %    Platelets 137 (L) 150 - 450 K/uL    MPV 9.8 9.2 - 12.9 fL    Immature Granulocytes 0.1 0.0 - 0.5 %    Gran # (ANC) 4.1 1.8 - 7.7 K/uL    Immature Grans (Abs) 0.01 0.00 - 0.04 K/uL    Lymph # 2.1 1.0 - 4.8 K/uL    Mono # 0.5 0.3 - 1.0 K/uL    Eos # 0.1 0.0 - 0.5 K/uL    Baso # 0.06 0.00 - 0.20 K/uL    nRBC 0 0 /100 WBC    Gran % 60.0 38.0 - 73.0 %    Lymph % 30.4 18.0 - 48.0 %    Mono % 7.7 4.0 - 15.0 %    Eosinophil % 0.9 0.0 - 8.0 %    Basophil % 0.9 0.0 - 1.9 %    Platelet Estimate Clumped (A)     Differential Method Automated    Comprehensive metabolic panel   Result Value Ref Range    Sodium 139 136 - 145 mmol/L    Potassium 4.0 3.5 - 5.1 mmol/L    Chloride 107 95 - 110 mmol/L    CO2 24 23 - 29 mmol/L    Glucose 91 70 - 110 mg/dL    BUN 12 8 - 23 mg/dL    Creatinine 0.8 0.5 - 1.4 mg/dL    Calcium 8.8 8.7 - 10.5 mg/dL    Total Protein 6.9 6.0 - 8.4 g/dL    Albumin 3.8 3.5 - 5.2 g/dL    Total Bilirubin 0.6 0.1 - 1.0 mg/dL    Alkaline Phosphatase 69 55 - 135 U/L    AST 24 10 - 40 U/L    ALT 18 10 - 44 U/L    Anion Gap 8 8 - 16 mmol/L    eGFR >60 >60 mL/min/1.73 m^2         Imaging Results:  Imaging Results              CT Abdomen Pelvis  Without Contrast (Final result)  Result time 10/05/22 16:15:38   Procedure changed from CT Abdomen Pelvis With Contrast     Final result by Boston Fuentes MD (10/05/22 16:15:38)                   Impression:      Right inferior rectus sheath abdominal wall hematoma.  Associated infiltration of the deep abdominal wall adipose tissue.  Overall size similar allowing for  differences in technique to the recent CT scan from outside institution at 07:00 hours.    Status post hysterectomy.    Cholelithiasis.    Solitary left kidney.    All CT scans at this facility are performed  using dose modulation techniques as appropriate to performed exam including the following:  automated exposure control; adjustment of mA and/or kV according to the patients size (this includes techniques or standardized protocols for targeted exams where dose is matched to indication/reason for exam: i.e. extremities or head);  iterative reconstruction technique.      Electronically signed by: Boston Fuentes MD  Date:    10/05/2022  Time:    16:15               Narrative:    EXAMINATION:  CT ABDOMEN PELVIS WITHOUT CONTRAST    CLINICAL HISTORY:  Right lower quadrant pain., Abdominal pain, acute (Ped 0-18y);    TECHNIQUE:  Limited noncontrast CT scan of the abdomen and pelvis.    COMPARISON:  Abdominal CT scan from outside institution 10/05/2022.    FINDINGS:  Lung bases are clear with no obvious infiltrate or pleural effusion.    The liver is nonenlarged.  The gallbladder demonstrates several medium-size calcified gallstones.  The gallbladder is mildly distended    The spleen is nonenlarged with several punctate calcifications.    The abdominal aorta is nondilated.  Minimal calcification is seen.  The IVC is normal.    The pancreatic region appears normal.    No visible right-sided kidney is seen.  The left kidney is grossly normal in size with contrast within the collecting system and ureter.  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    The bowel loops are nondilated.    In the pelvis there is hyperdense contrast in the urinary bladder.    The uterus is absent.    There is hyperdense thickening of the right rectus muscle with infiltration of the deep abdominal wall adipose tissue.  Findings are consistent with a right rectus sheath hematoma.  The overall size is similar  to the recent CT scan.    Minimal pelvic fluid.    Moderate lumbar degenerative disc disease.                                         Labs and Imaging Reviewed from [facility]:    CT Abdomen/Pelvis with Contrast:    Impression: Hematoma, right rectus abdominis muscle with active bleeding. In the right rectus sheath, there is marked enlargement of the rectus abdominous muscle with heterogenous pattern. The area shows an area of focal enhancement with active bleeding into a hematoma. The area measures approximately 6 x 3.5 x 9.5 cm.    CBC, CMP, and Lipase were all WNL  Flu and COVID were both negative         The Emergency Provider reviewed the vital signs and test results, which are outlined above.    ED Discussion     3:09 PM: Discussed pt's case with Dr. Bustamante (General Surgery) who recommends admission to Hospital Medicine with Interventional Radiology consult. Dr. Bustamante will see the pt as a consult.    3:15 PM: Discussed pt's case with Dr. Abbott (Interventional Radiology) who recommends repeat CBC and repeat pelvic CT w/o contrast. Dr. Abbott also recommends admission to Hospital Medicine for serial hemoglobin.    4:11 PM: Discussed case with Patricia Baker NP (Hospital Medicine). Dr. Rodrigues agrees with current care and management of pt and accepts admission.   Admitting Service: Hospital Medicine  Admitting Physician: Dr. Rodrigues  Admit to: Obs    4:12 PM: Re-evaluated pt. I have discussed test results, shared treatment plan, and the need for admission with patient and family at bedside. Pt and family express understanding at this time and agree with all information. All questions answered. Pt and family have no further questions or concerns at this time. Pt is ready for admit.         ED Medication(s):  Medications - No data to display      New Prescriptions    No medications on file         Medical Decision Making    Medical Decision Making:   Clinical Tests:   Lab Tests: Ordered and  Reviewed  Radiological Study: Ordered and Reviewed         Scribe Attestation:   Scribe #1: I performed the above scribed service and the documentation accurately describes the services I performed. I attest to the accuracy of the note.    Attending:   Physician Attestation Statement for Scribe #1: I, Calvin Harrington Jr., MD, personally performed the services described in this documentation, as scribed by Juan Ramon Escamilla, in my presence, and it is both accurate and complete.          Clinical Impression       ICD-10-CM ICD-9-CM   1. Abdominal wall hematoma, initial encounter  S30.1XXA 922.2       Disposition:   Disposition: Placed in Observation  Condition: Fair       Calvin Harrington Jr., MD  10/05/22 9847

## 2022-10-05 NOTE — ASSESSMENT & PLAN NOTE
Traumatic rectus sheath hematoma.      Although there was a contrast blush on the initial CT scan the repeat CT scan shows no significant change in size.  The patient is pain free.      Her hemoglobin and hematocrit are stable.      Recommend admission to observation, Hospital Medicine   Serial hemoglobin and hematocrit  If her hemoglobin and hematocrit drops then angiography with embolization per Interventional Radiology.      Surgical intervention only if enlarging hematoma that could not be controlled by Interventional Radiology of the patient develops an infection.      This was discussed with the patient and her relative, son.

## 2022-10-05 NOTE — HOSPITAL COURSE
The patient was transferred to the emergency room.  A repeat CT scan will be obtained as well as labs she is resting comfortably and denies any abdominal pain.  She is not tachycardic or diaphoretic.    10/6/2:  no pain, hemoglobin 11

## 2022-10-05 NOTE — SUBJECTIVE & OBJECTIVE
Past Medical History:   Diagnosis Date    Cancer     Diabetes mellitus     Hypertension        Past Surgical History:   Procedure Laterality Date    FRACTURE SURGERY      HYSTERECTOMY      LAPAROSCOPIC NEPHRECTOMY         Review of patient's allergies indicates:  No Known Allergies    No current facility-administered medications on file prior to encounter.     Current Outpatient Medications on File Prior to Encounter   Medication Sig    ascorbic acid, vitamin C, (VITAMIN C) 1000 MG tablet Take 1,000 mg by mouth once daily.    cyanocobalamin (VITAMIN B-12) 1000 MCG tablet Take 1,000 mcg by mouth once daily.    DAILY GARLIC ONCE-A-DAY ORAL Take 1 tablet by mouth once daily.    docusate sodium (COLACE) 100 MG capsule Take 100 mg by mouth every evening.    losartan-hydrochlorothiazide 50-12.5 mg (HYZAAR) 50-12.5 mg per tablet Take 1 tablet by mouth once daily.    montelukast (SINGULAIR) 10 mg tablet Take 10 mg by mouth once daily.    NIFEdipine (PROCARDIA-XL) 90 MG (OSM) 24 hr tablet Take 90 mg by mouth once daily.    omega-3 fatty acids/fish oil (FISH OIL-OMEGA-3 FATTY ACIDS) 300-1,000 mg capsule Take 1 capsule by mouth once daily.    paroxetine (PAXIL) 20 MG tablet Take 20 mg by mouth once daily.    pravastatin (PRAVACHOL) 40 MG tablet Take 40 mg by mouth every evening.    traZODone (DESYREL) 50 MG tablet Take 75 mg by mouth every evening.    vit L-mnqiprw-ffko-rutin-hb196 (BIOFLEX) 473-18-06-40 mg Tab Take 1 tablet by mouth once daily.    vitamin D (VITAMIN D3) 1000 units Tab Take 1,000 Units by mouth once daily.    zinc sulfate (ZINCATE) 50 mg zinc (220 mg) capsule Take 220 mg by mouth once daily.     Family History       Problem Relation (Age of Onset)    Diabetes Father    Heart disease Father    Hypertension Mother    Stroke Mother          Tobacco Use    Smoking status: Never    Smokeless tobacco: Never   Substance and Sexual Activity    Alcohol use: Not Currently     Alcohol/week: 1.0 standard drink     Types:  1 Glasses of wine per week    Drug use: Never    Sexual activity: Not Currently     Partners: Male     Review of Systems   Constitutional:  Positive for activity change. Negative for unexpected weight change.   HENT: Negative.  Negative for trouble swallowing.    Eyes: Negative.    Respiratory: Negative.  Negative for cough, shortness of breath and wheezing.    Cardiovascular: Negative.  Negative for chest pain.   Gastrointestinal:  Positive for abdominal pain.   Endocrine: Negative.    Genitourinary: Negative.    Musculoskeletal: Negative.    Skin: Negative.    Allergic/Immunologic: Negative.    Neurological:  Positive for weakness. Negative for dizziness.   Hematological: Negative.    Psychiatric/Behavioral: Negative.     All other systems reviewed and are negative.  Objective:     Vital Signs (Most Recent):  Temp: 98.6 °F (37 °C) (10/05/22 1357)  Pulse: 62 (10/05/22 1630)  Resp: (!) 24 (10/05/22 1630)  BP: 121/62 (10/05/22 1630)  SpO2: 97 % (10/05/22 1630)   Vital Signs (24h Range):  Temp:  [98.6 °F (37 °C)] 98.6 °F (37 °C)  Pulse:  [58-80] 62  Resp:  [18-24] 24  SpO2:  [97 %-99 %] 97 %  BP: (121-162)/(62-77) 121/62     Weight: 64.9 kg (143 lb)  Body mass index is 23.8 kg/m².    Physical Exam  Vitals and nursing note reviewed.   Constitutional:       General: She is not in acute distress.     Appearance: Normal appearance. She is well-developed. She is not ill-appearing, toxic-appearing or diaphoretic.   HENT:      Head: Normocephalic and atraumatic.      Right Ear: External ear normal.      Left Ear: External ear normal.   Eyes:      Pupils: Pupils are equal, round, and reactive to light.   Neck:      Vascular: No JVD.   Cardiovascular:      Rate and Rhythm: Normal rate and regular rhythm.      Pulses: Normal pulses.      Heart sounds: Normal heart sounds. No murmur heard.  Pulmonary:      Effort: Pulmonary effort is normal. No respiratory distress.      Breath sounds: Normal breath sounds. No wheezing.    Abdominal:      General: Bowel sounds are normal. There is no distension.      Palpations: Abdomen is soft.      Tenderness: There is abdominal tenderness. There is no guarding.   Musculoskeletal:         General: No tenderness. Normal range of motion.      Cervical back: Normal range of motion and neck supple.   Skin:     General: Skin is warm and dry.      Findings: No erythema.   Neurological:      Mental Status: She is alert and oriented to person, place, and time.      Cranial Nerves: No cranial nerve deficit.      Deep Tendon Reflexes: Reflexes are normal and symmetric.   Psychiatric:         Mood and Affect: Mood normal.         Behavior: Behavior normal.         Thought Content: Thought content normal.         Judgment: Judgment normal.         CRANIAL NERVES     CN III, IV, VI   Pupils are equal, round, and reactive to light.     Significant Labs: All pertinent labs within the past 24 hours have been reviewed.  BMP:   Recent Labs   Lab 10/05/22  1534   GLU 91      K 4.0      CO2 24   BUN 12   CREATININE 0.8   CALCIUM 8.8     CBC:   Recent Labs   Lab 10/05/22  1534   WBC 6.85   HGB 12.4   HCT 37.0   *     CMP:   Recent Labs   Lab 10/05/22  1534      K 4.0      CO2 24   GLU 91   BUN 12   CREATININE 0.8   CALCIUM 8.8   PROT 6.9   ALBUMIN 3.8   BILITOT 0.6   ALKPHOS 69   AST 24   ALT 18   ANIONGAP 8     Imaging Results              CT Abdomen Pelvis  Without Contrast (Final result)  Result time 10/05/22 16:15:38   Procedure changed from CT Abdomen Pelvis With Contrast     Final result by Boston Fuentes MD (10/05/22 16:15:38)                   Impression:      Right inferior rectus sheath abdominal wall hematoma.  Associated infiltration of the deep abdominal wall adipose tissue.  Overall size similar allowing for differences in technique to the recent CT scan from outside institution at 07:00 hours.    Status post hysterectomy.    Cholelithiasis.    Solitary left  kidney.    All CT scans at this facility are performed  using dose modulation techniques as appropriate to performed exam including the following:  automated exposure control; adjustment of mA and/or kV according to the patients size (this includes techniques or standardized protocols for targeted exams where dose is matched to indication/reason for exam: i.e. extremities or head);  iterative reconstruction technique.      Electronically signed by: Boston Fuentes MD  Date:    10/05/2022  Time:    16:15               Narrative:    EXAMINATION:  CT ABDOMEN PELVIS WITHOUT CONTRAST    CLINICAL HISTORY:  Right lower quadrant pain., Abdominal pain, acute (Ped 0-18y);    TECHNIQUE:  Limited noncontrast CT scan of the abdomen and pelvis.    COMPARISON:  Abdominal CT scan from outside institution 10/05/2022.    FINDINGS:  Lung bases are clear with no obvious infiltrate or pleural effusion.    The liver is nonenlarged.  The gallbladder demonstrates several medium-size calcified gallstones.  The gallbladder is mildly distended    The spleen is nonenlarged with several punctate calcifications.    The abdominal aorta is nondilated.  Minimal calcification is seen.  The IVC is normal.    The pancreatic region appears normal.    No visible right-sided kidney is seen.  The left kidney is grossly normal in size with contrast within the collecting system and ureter.  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    The bowel loops are nondilated.    In the pelvis there is hyperdense contrast in the urinary bladder.    The uterus is absent.    There is hyperdense thickening of the right rectus muscle with infiltration of the deep abdominal wall adipose tissue.  Findings are consistent with a right rectus sheath hematoma.  The overall size is similar to the recent CT scan.    Minimal pelvic fluid.    Moderate lumbar degenerative disc disease.                                        Significant Imaging:  I have reviewed all pertinent imaging results/findings within the past 24 hours.

## 2022-10-05 NOTE — HPI
77-year-old female who presented to [facility] emergency room due to some right lower quadrant abdominal pain.  She was concerned that she might have appendicitis.  The patient has been doing what she describes as rather strenuous yd work over the last 2 or 3 days.      The pain was located in the right lower abdomen was sharp to stabbing in nature.  Was not associated with any nausea, vomiting, fever chills.      She was evaluated with a CT scan there that showed a rectus sheath hematoma with active extravasation.  She was transferred to [facility] in [place] as there was no Interventional Radiology available at [facility].      Her only antiplatelet medication is aspirin

## 2022-10-06 VITALS
WEIGHT: 143 LBS | RESPIRATION RATE: 18 BRPM | OXYGEN SATURATION: 95 % | HEART RATE: 65 BPM | TEMPERATURE: 99 F | DIASTOLIC BLOOD PRESSURE: 68 MMHG | SYSTOLIC BLOOD PRESSURE: 126 MMHG | BODY MASS INDEX: 23.82 KG/M2 | HEIGHT: 65 IN

## 2022-10-06 LAB
ANION GAP SERPL CALC-SCNC: 9 MMOL/L (ref 8–16)
BASOPHILS # BLD AUTO: 0.05 K/UL (ref 0–0.2)
BASOPHILS NFR BLD: 0.8 % (ref 0–1.9)
BUN SERPL-MCNC: 13 MG/DL (ref 8–23)
CALCIUM SERPL-MCNC: 9.4 MG/DL (ref 8.7–10.5)
CHLORIDE SERPL-SCNC: 106 MMOL/L (ref 95–110)
CO2 SERPL-SCNC: 27 MMOL/L (ref 23–29)
CREAT SERPL-MCNC: 0.9 MG/DL (ref 0.5–1.4)
DIFFERENTIAL METHOD: ABNORMAL
EOSINOPHIL # BLD AUTO: 0.1 K/UL (ref 0–0.5)
EOSINOPHIL NFR BLD: 1.8 % (ref 0–8)
ERYTHROCYTE [DISTWIDTH] IN BLOOD BY AUTOMATED COUNT: 13.1 % (ref 11.5–14.5)
EST. GFR  (NO RACE VARIABLE): >60 ML/MIN/1.73 M^2
GLUCOSE SERPL-MCNC: 106 MG/DL (ref 70–110)
HCT VFR BLD AUTO: 35 % (ref 37–48.5)
HGB BLD-MCNC: 11.5 G/DL (ref 12–16)
IMM GRANULOCYTES # BLD AUTO: 0.02 K/UL (ref 0–0.04)
IMM GRANULOCYTES NFR BLD AUTO: 0.3 % (ref 0–0.5)
LYMPHOCYTES # BLD AUTO: 2.1 K/UL (ref 1–4.8)
LYMPHOCYTES NFR BLD: 31.3 % (ref 18–48)
MCH RBC QN AUTO: 30.7 PG (ref 27–31)
MCHC RBC AUTO-ENTMCNC: 32.9 G/DL (ref 32–36)
MCV RBC AUTO: 93 FL (ref 82–98)
MONOCYTES # BLD AUTO: 0.6 K/UL (ref 0.3–1)
MONOCYTES NFR BLD: 8.7 % (ref 4–15)
NEUTROPHILS # BLD AUTO: 3.7 K/UL (ref 1.8–7.7)
NEUTROPHILS NFR BLD: 57.1 % (ref 38–73)
NRBC BLD-RTO: 0 /100 WBC
PLATELET # BLD AUTO: 267 K/UL (ref 150–450)
PMV BLD AUTO: 9 FL (ref 9.2–12.9)
POCT GLUCOSE: 101 MG/DL (ref 70–110)
POTASSIUM SERPL-SCNC: 4.6 MMOL/L (ref 3.5–5.1)
RBC # BLD AUTO: 3.75 M/UL (ref 4–5.4)
SODIUM SERPL-SCNC: 142 MMOL/L (ref 136–145)
WBC # BLD AUTO: 6.55 K/UL (ref 3.9–12.7)

## 2022-10-06 PROCEDURE — 80048 BASIC METABOLIC PNL TOTAL CA: CPT | Performed by: INTERNAL MEDICINE

## 2022-10-06 PROCEDURE — 99213 PR OFFICE/OUTPT VISIT, EST, LEVL III, 20-29 MIN: ICD-10-PCS | Mod: ,,, | Performed by: SURGERY

## 2022-10-06 PROCEDURE — 25000003 PHARM REV CODE 250: Performed by: INTERNAL MEDICINE

## 2022-10-06 PROCEDURE — G0378 HOSPITAL OBSERVATION PER HR: HCPCS

## 2022-10-06 PROCEDURE — 36415 COLL VENOUS BLD VENIPUNCTURE: CPT | Performed by: INTERNAL MEDICINE

## 2022-10-06 PROCEDURE — 85025 COMPLETE CBC W/AUTO DIFF WBC: CPT | Performed by: INTERNAL MEDICINE

## 2022-10-06 PROCEDURE — A4216 STERILE WATER/SALINE, 10 ML: HCPCS | Performed by: INTERNAL MEDICINE

## 2022-10-06 PROCEDURE — 99213 OFFICE O/P EST LOW 20 MIN: CPT | Mod: ,,, | Performed by: SURGERY

## 2022-10-06 RX ADMIN — PAROXETINE HYDROCHLORIDE 20 MG: 20 TABLET, FILM COATED ORAL at 08:10

## 2022-10-06 RX ADMIN — Medication 10 ML: at 06:10

## 2022-10-06 NOTE — DISCHARGE SUMMARY
[facility] Med [facility]  [facility] Medicine  Discharge Summary      Patient Name: Evie Matthews  MRN: [id number]  Patient Class: OP- Observation  Admission Date: 10/5/2022  Hospital Length of Stay: 0 days  Discharge Date and Time: 10/6/2022  9:07 AM  Attending Physician: Segundo Rodrigues MD   Discharging Provider: Tommy Jeffries NP  Primary Care Provider: Herb Wesley MD      HPI:   Patient 78 yo female presenting in transfer from [facility] for Rectus Abdominal Wall Hematoma. Patient with a  pmhx significant for RCC s/p R Nephrectomy, DM2, HTN identified with R Abdominal wall hematoma - Patient underwent a follow up CT on presentation and it was determined the Hematoma was unchanged from th 0700 exam. Patient reports improvement in her discomfort and is requesting food. Patient with a fall from her hourse several days ago with a progressive abdominal pain. Patient reports symptoms are controlled and currently she is pain free. Patient case reviewed with IR who felt no intervention was warranted at this time. Plan is to place the patient in observation, NPO after midnight, hold anticoagulation, obtain H/H in am and consider next steps based on the developing clinical picture. Surgery evaluated the patient and did not feel the patient had a surgical need at this juncture.     Patient is a full code                   * No surgery found *      Hospital Course:   Ms Matthews is a 77 year old female who presented to [facility] from [facility] for further evaluation of Rectus sheath hematoma. There was no IR available at [facility]. Case was reviewed with IR with Melvin who felt no intervention was warranted at this time. General Surgery was consulted, who recommend since hemoglobin and hematocrit are stable. Continue serial H & H. Today,  patient feeling well, vital signs and labs stable. Per general Surgery, H & H are stable, no acute need for surgery. Patient was seen, examined and deemed stable for discharge.         Goals of Care Treatment Preferences:  Code Status: Full Code      Consults:     No new Assessment & Plan notes have been filed under this hospital service since the last note was generated.  Service: Hospital Medicine    Final Active Diagnoses:    Diagnosis Date Noted POA    PRINCIPAL PROBLEM:  Rectus sheath hematoma, initial encounter [S30.1XXA] 10/05/2022 Yes    Primary hypertension [I10] 10/05/2022 Yes    Renal cell carcinoma of right kidney [C64.1] 10/05/2022 Yes    Type 2 diabetes mellitus [E11.9] 10/05/2022 Yes      Problems Resolved During this Admission:       Discharged Condition: stable    Disposition: Home or Self Care    Follow Up:   Follow-up Information     Herb Wesley MD. Schedule an appointment as soon as possible for a visit in 4 day(s).    Specialty: Family Medicine  Why: Hosptial follow up for Rectus Sheath hematoma  Contact information:  [address] [id number] [phone]                       Patient Instructions:      Diet Cardiac     Activity as tolerated       Significant Diagnostic Studies: Labs:   CMP   Recent Labs   Lab 10/05/22  1534 10/06/22  0530    142   K 4.0 4.6    106   CO2 24 27   GLU 91 106   BUN 12 13   CREATININE 0.8 0.9   CALCIUM 8.8 9.4   PROT 6.9  --    ALBUMIN 3.8  --    BILITOT 0.6  --    ALKPHOS 69  --    AST 24  --    ALT 18  --    ANIONGAP 8 9    and CBC   Recent Labs   Lab 10/05/22  1534 10/06/22  0531   WBC 6.85 6.55   HGB 12.4 11.5*   HCT 37.0 35.0*   * 267       Pending Diagnostic Studies:     None         Medications:  Reconciled Home Medications:      Medication List      CONTINUE taking these medications    BIOFLEX 938-34-50-40 mg Tab  Generic drug: vit T-htqiyys-iduz-rutin-hb196  Take 1 tablet by mouth once daily.     cyanocobalamin 1000 MCG tablet  Commonly known as: VITAMIN B-12  Take 1,000 mcg by mouth once daily.     DAILY GARLIC ONCE-A-DAY ORAL  Take 1 tablet by mouth once daily.     docusate  sodium 100 MG capsule  Commonly known as: COLACE  Take 100 mg by mouth every evening.     fish oil-omega-3 fatty acids 300-1,000 mg capsule  Take 1 capsule by mouth once daily.     losartan-hydrochlorothiazide 50-12.5 mg 50-12.5 mg per tablet  Commonly known as: HYZAAR  Take 1 tablet by mouth once daily.     montelukast 10 mg tablet  Commonly known as: SINGULAIR  Take 10 mg by mouth once daily.     NIFEdipine 90 MG (OSM) 24 hr tablet  Commonly known as: PROCARDIA-XL  Take 90 mg by mouth once daily.     paroxetine 20 MG tablet  Commonly known as: PAXIL  Take 20 mg by mouth once daily.     pravastatin 40 MG tablet  Commonly known as: PRAVACHOL  Take 40 mg by mouth every evening.     traZODone 50 MG tablet  Commonly known as: DESYREL  Take 75 mg by mouth every evening.     VITAMIN C 1000 MG tablet  Generic drug: ascorbic acid (vitamin C)  Take 1,000 mg by mouth once daily.     vitamin D 1000 units Tab  Commonly known as: VITAMIN D3  Take 1,000 Units by mouth once daily.     zinc sulfate 50 mg zinc (220 mg) capsule  Commonly known as: ZINCATE  Take 220 mg by mouth once daily.            Indwelling Lines/Drains at time of discharge:   Lines/Drains/Airways     None                 Time spent on the discharge of patient: 45 minutes         Tommy Jeffries NP  Department of Hospital Medicine  [facility]

## 2022-10-06 NOTE — PROGRESS NOTES
[facility]  General Surgery  Progress Note    Subjective:     History of Present Illness:  77-year-old female who presented to [facility] emergency room due to some right lower quadrant abdominal pain.  She was concerned that she might have appendicitis.  The patient has been doing what she describes as rather strenuous yd work over the last 2 or 3 days.      The pain was located in the right lower abdomen was sharp to stabbing in nature.  Was not associated with any nausea, vomiting, fever chills.      She was evaluated with a CT scan there that showed a rectus sheath hematoma with active extravasation.  She was transferred to [facility] in [place] as there was no Interventional Radiology available at [facility].      Her only antiplatelet medication is aspirin           Post-Op Info:  * No surgery found *         Interval History: no pain, hemoglobin stable    Medications:  Continuous Infusions:  Scheduled Meds:   paroxetine  20 mg Oral Daily    pravastatin  40 mg Oral QHS    sodium chloride 0.9%  10 mL Intravenous Q8H    traZODone  75 mg Oral QHS     PRN Meds:acetaminophen, albuterol-ipratropium, aluminum-magnesium hydroxide-simethicone, dextrose 10%, dextrose 10%, glucagon (human recombinant), glucose, glucose, HYDROcodone-acetaminophen, HYDROcodone-acetaminophen, insulin aspart U-100, magnesium oxide, magnesium oxide, melatonin, naloxone, ondansetron, ondansetron, potassium bicarbonate, potassium bicarbonate, potassium bicarbonate, potassium, sodium phosphates, potassium, sodium phosphates, potassium, sodium phosphates, senna-docusate 8.6-50 mg, simethicone, sodium chloride 0.9%     Review of patient's allergies indicates:  No Known Allergies  Objective:     Vital Signs (Most Recent):  Temp: 99.2 °F (37.3 °C) (10/06/22 0745)  Pulse: 65 (10/06/22 0745)  Resp: 18 (10/06/22 0745)  BP: 126/68 (10/06/22 0745)  SpO2: 95 % (10/06/22 0745) Vital Signs (24h Range):  Temp:  [98.1 °F (36.7  °C)-99.2 °F (37.3 °C)] 99.2 °F (37.3 °C)  Pulse:  [58-80] 65  Resp:  [17-24] 18  SpO2:  [95 %-99 %] 95 %  BP: (106-162)/(62-79) 126/68     Weight: 64.9 kg (143 lb)  Body mass index is 23.8 kg/m².    Intake/Output - Last 3 Shifts         10/04 0700  10/05 0659 10/05 0700  10/06 0659 10/06 0700  10/07 0659           Stool Occurrence   0 x            Physical Exam  Constitutional:       Appearance: She is well-developed.   HENT:      Head: Normocephalic.   Eyes:      Pupils: Pupils are equal, round, and reactive to light.   Neck:      Thyroid: No thyromegaly.      Vascular: No JVD.      Trachea: No tracheal deviation.   Cardiovascular:      Rate and Rhythm: Normal rate and regular rhythm.      Heart sounds: Normal heart sounds.   Pulmonary:      Breath sounds: Normal breath sounds. No wheezing.   Abdominal:      General: Bowel sounds are normal. There is no distension.      Palpations: Abdomen is soft. Abdomen is not rigid. There is no mass.      Tenderness: There is abdominal tenderness (rlq). There is no guarding or rebound.   Musculoskeletal:         General: Normal range of motion.   Lymphadenopathy:      Cervical: No cervical adenopathy.   Skin:     General: Skin is warm and dry.      Findings: No erythema or rash.   Neurological:      Mental Status: She is oriented to person, place, and time.       Significant Labs:  I have reviewed all pertinent lab results within the past 24 hours.  CBC:   Recent Labs   Lab 10/06/22  0531   WBC 6.55   RBC 3.75*   HGB 11.5*   HCT 35.0*      MCV 93   MCH 30.7   MCHC 32.9     BMP:   Recent Labs   Lab 10/06/22  0530         K 4.6      CO2 27   BUN 13   CREATININE 0.9   CALCIUM 9.4       Significant Diagnostics:  I have reviewed all pertinent imaging results/findings within the past 24 hours.  No new    Assessment/Plan:     * Rectus sheath hematoma, initial encounter  Traumatic rectus sheath hematoma.      Although there was a contrast blush on the initial  CT scan the repeat CT scan shows no significant change in size.  The patient is pain free except with palpation.      Her hemoglobin and hematocrit are stable.      No acute need for surgery    Recall if needed            Segun Bustamante MD  General Surgery  [facility]

## 2022-10-06 NOTE — ASSESSMENT & PLAN NOTE
Traumatic rectus sheath hematoma.      Although there was a contrast blush on the initial CT scan the repeat CT scan shows no significant change in size.  The patient is pain free except with palpation.      Her hemoglobin and hematocrit are stable.      No acute need for surgery    Recall if needed

## 2022-10-06 NOTE — HOSPITAL COURSE
Ms Matthews is a 77 year old female who presented to [facility] from [facility] for further evaluation of Rectus sheath hematoma. There was no IR available at [facility]. Case was reviewed with IR with Melvin who felt no intervention was warranted at this time. General Surgery was consulted, who recommend since hemoglobin and hematocrit are stable. Continue serial H & H. Today,  patient feeling well, vital signs and labs stable. Per general Surgery, H & H are stable, no acute need for surgery. Patient was seen, examined and deemed stable for discharge.

## 2022-10-06 NOTE — PLAN OF CARE
Pt AAO x4.  Pt remains free of falls throughout shift.  Pt denies pain throughout shift.  Plan of care reviewed. Pt verbalizes understanding.  Pt moving and turning independently. Frequent weight shifting encouraged.    Went over discharge instructions with patient at bedside.  Stressed the importance of making and keeping all appointments.  Pt verbalized understanding.   Had all questions regarding discharge answered to satisfaction.  IV removed without complications.  Tele box removed and returned to monitor tech.  Discharged patient via wheelchair to personal transportation.

## 2022-10-06 NOTE — SUBJECTIVE & OBJECTIVE
Interval History: no pain, hemoglobin stable    Medications:  Continuous Infusions:  Scheduled Meds:   paroxetine  20 mg Oral Daily    pravastatin  40 mg Oral QHS    sodium chloride 0.9%  10 mL Intravenous Q8H    traZODone  75 mg Oral QHS     PRN Meds:acetaminophen, albuterol-ipratropium, aluminum-magnesium hydroxide-simethicone, dextrose 10%, dextrose 10%, glucagon (human recombinant), glucose, glucose, HYDROcodone-acetaminophen, HYDROcodone-acetaminophen, insulin aspart U-100, magnesium oxide, magnesium oxide, melatonin, naloxone, ondansetron, ondansetron, potassium bicarbonate, potassium bicarbonate, potassium bicarbonate, potassium, sodium phosphates, potassium, sodium phosphates, potassium, sodium phosphates, senna-docusate 8.6-50 mg, simethicone, sodium chloride 0.9%     Review of patient's allergies indicates:  No Known Allergies  Objective:     Vital Signs (Most Recent):  Temp: 99.2 °F (37.3 °C) (10/06/22 0745)  Pulse: 65 (10/06/22 0745)  Resp: 18 (10/06/22 0745)  BP: 126/68 (10/06/22 0745)  SpO2: 95 % (10/06/22 0745) Vital Signs (24h Range):  Temp:  [98.1 °F (36.7 °C)-99.2 °F (37.3 °C)] 99.2 °F (37.3 °C)  Pulse:  [58-80] 65  Resp:  [17-24] 18  SpO2:  [95 %-99 %] 95 %  BP: (106-162)/(62-79) 126/68     Weight: 64.9 kg (143 lb)  Body mass index is 23.8 kg/m².    Intake/Output - Last 3 Shifts         10/04 0700  10/05 0659 10/05 0700  10/06 0659 10/06 0700  10/07 0659           Stool Occurrence   0 x            Physical Exam  Constitutional:       Appearance: She is well-developed.   HENT:      Head: Normocephalic.   Eyes:      Pupils: Pupils are equal, round, and reactive to light.   Neck:      Thyroid: No thyromegaly.      Vascular: No JVD.      Trachea: No tracheal deviation.   Cardiovascular:      Rate and Rhythm: Normal rate and regular rhythm.      Heart sounds: Normal heart sounds.   Pulmonary:      Breath sounds: Normal breath sounds. No wheezing.   Abdominal:      General: Bowel sounds are normal.  There is no distension.      Palpations: Abdomen is soft. Abdomen is not rigid. There is no mass.      Tenderness: There is abdominal tenderness (rlq). There is no guarding or rebound.   Musculoskeletal:         General: Normal range of motion.   Lymphadenopathy:      Cervical: No cervical adenopathy.   Skin:     General: Skin is warm and dry.      Findings: No erythema or rash.   Neurological:      Mental Status: She is oriented to person, place, and time.       Significant Labs:  I have reviewed all pertinent lab results within the past 24 hours.  CBC:   Recent Labs   Lab 10/06/22  0531   WBC 6.55   RBC 3.75*   HGB 11.5*   HCT 35.0*      MCV 93   MCH 30.7   MCHC 32.9     BMP:   Recent Labs   Lab 10/06/22  0530         K 4.6      CO2 27   BUN 13   CREATININE 0.9   CALCIUM 9.4       Significant Diagnostics:  I have reviewed all pertinent imaging results/findings within the past 24 hours.  No new